# Patient Record
Sex: MALE | Race: WHITE | NOT HISPANIC OR LATINO | Employment: FULL TIME | URBAN - METROPOLITAN AREA
[De-identification: names, ages, dates, MRNs, and addresses within clinical notes are randomized per-mention and may not be internally consistent; named-entity substitution may affect disease eponyms.]

---

## 2020-02-20 ENCOUNTER — OCCMED (OUTPATIENT)
Dept: URGENT CARE | Facility: CLINIC | Age: 40
End: 2020-02-20
Payer: OTHER MISCELLANEOUS

## 2020-02-20 ENCOUNTER — APPOINTMENT (OUTPATIENT)
Dept: RADIOLOGY | Facility: CLINIC | Age: 40
End: 2020-02-20
Payer: OTHER MISCELLANEOUS

## 2020-02-20 DIAGNOSIS — S99.922A FOOT INJURY, LEFT, INITIAL ENCOUNTER: Primary | ICD-10-CM

## 2020-02-20 DIAGNOSIS — S99.922A FOOT INJURY, LEFT, INITIAL ENCOUNTER: ICD-10-CM

## 2020-02-20 PROCEDURE — 99283 EMERGENCY DEPT VISIT LOW MDM: CPT | Performed by: NURSE PRACTITIONER

## 2020-02-20 PROCEDURE — 73590 X-RAY EXAM OF LOWER LEG: CPT

## 2020-02-20 PROCEDURE — 73610 X-RAY EXAM OF ANKLE: CPT

## 2020-02-20 PROCEDURE — G0382 LEV 3 HOSP TYPE B ED VISIT: HCPCS | Performed by: NURSE PRACTITIONER

## 2020-02-20 PROCEDURE — 73630 X-RAY EXAM OF FOOT: CPT

## 2020-02-27 ENCOUNTER — APPOINTMENT (OUTPATIENT)
Dept: URGENT CARE | Facility: CLINIC | Age: 40
End: 2020-02-27
Payer: OTHER MISCELLANEOUS

## 2020-02-27 PROCEDURE — 99213 OFFICE O/P EST LOW 20 MIN: CPT | Performed by: NURSE PRACTITIONER

## 2020-03-05 ENCOUNTER — APPOINTMENT (OUTPATIENT)
Dept: URGENT CARE | Facility: CLINIC | Age: 40
End: 2020-03-05
Payer: OTHER MISCELLANEOUS

## 2020-03-05 PROCEDURE — 99213 OFFICE O/P EST LOW 20 MIN: CPT | Performed by: NURSE PRACTITIONER

## 2021-10-11 ENCOUNTER — TRANSITIONAL CARE MANAGEMENT (OUTPATIENT)
Dept: FAMILY MEDICINE CLINIC | Facility: CLINIC | Age: 41
End: 2021-10-11

## 2021-10-18 ENCOUNTER — APPOINTMENT (OUTPATIENT)
Dept: LAB | Facility: HOSPITAL | Age: 41
End: 2021-10-18

## 2021-10-18 ENCOUNTER — OFFICE VISIT (OUTPATIENT)
Dept: FAMILY MEDICINE CLINIC | Facility: CLINIC | Age: 41
End: 2021-10-18

## 2021-10-18 VITALS
HEIGHT: 72 IN | OXYGEN SATURATION: 92 % | HEART RATE: 75 BPM | SYSTOLIC BLOOD PRESSURE: 138 MMHG | DIASTOLIC BLOOD PRESSURE: 78 MMHG | TEMPERATURE: 97.1 F | WEIGHT: 179.6 LBS | BODY MASS INDEX: 24.33 KG/M2 | RESPIRATION RATE: 17 BRPM

## 2021-10-18 DIAGNOSIS — D72.829 LEUKOCYTOSIS, UNSPECIFIED TYPE: Primary | ICD-10-CM

## 2021-10-18 DIAGNOSIS — K85.20 ALCOHOL-INDUCED ACUTE PANCREATITIS, UNSPECIFIED COMPLICATION STATUS: ICD-10-CM

## 2021-10-18 DIAGNOSIS — K70.10 ACUTE ALCOHOLIC LIVER DISEASE: ICD-10-CM

## 2021-10-18 DIAGNOSIS — I10 ESSENTIAL HYPERTENSION: ICD-10-CM

## 2021-10-18 PROBLEM — R65.10 SIRS DUE TO NON-INFECTIOUS PROCESS WITHOUT ACUTE ORGAN DYSFUNCTION (HCC): Status: ACTIVE | Noted: 2021-09-23

## 2021-10-18 PROBLEM — F10.10 ALCOHOL ABUSE: Status: ACTIVE | Noted: 2019-09-11

## 2021-10-18 PROBLEM — Z72.0 TOBACCO ABUSE: Status: ACTIVE | Noted: 2021-09-22

## 2021-10-18 PROBLEM — N17.9 ACUTE KIDNEY INJURY (HCC): Status: ACTIVE | Noted: 2021-09-24

## 2021-10-18 PROCEDURE — 99495 TRANSJ CARE MGMT MOD F2F 14D: CPT | Performed by: FAMILY MEDICINE

## 2021-10-18 RX ORDER — AMLODIPINE BESYLATE 10 MG/1
TABLET ORAL
COMMUNITY
Start: 2021-10-07

## 2021-10-18 RX ORDER — CLONIDINE HYDROCHLORIDE 0.2 MG/1
TABLET ORAL
COMMUNITY
Start: 2021-10-07

## 2021-10-18 RX ORDER — LABETALOL 200 MG/1
TABLET, FILM COATED ORAL
COMMUNITY
Start: 2021-10-07

## 2021-10-18 RX ORDER — LANSOPRAZOLE 30 MG/1
30 CAPSULE, DELAYED RELEASE ORAL DAILY
COMMUNITY

## 2021-10-18 RX ORDER — PANTOPRAZOLE SODIUM 40 MG/1
TABLET, DELAYED RELEASE ORAL
COMMUNITY
Start: 2021-10-07

## 2021-10-18 RX ORDER — PROBENECID AND COLCHICINE 500; .5 MG/1; MG/1
1 TABLET ORAL DAILY
COMMUNITY
Start: 2021-10-07 | End: 2021-10-27

## 2021-10-18 RX ORDER — DIPHENOXYLATE HYDROCHLORIDE AND ATROPINE SULFATE 2.5; .025 MG/1; MG/1
TABLET ORAL
COMMUNITY
Start: 2021-10-07

## 2021-10-19 ENCOUNTER — TELEPHONE (OUTPATIENT)
Dept: FAMILY MEDICINE CLINIC | Facility: CLINIC | Age: 41
End: 2021-10-19

## 2023-02-22 ENCOUNTER — TELEPHONE (OUTPATIENT)
Dept: FAMILY MEDICINE CLINIC | Facility: CLINIC | Age: 43
End: 2023-02-22

## 2023-02-22 NOTE — TELEPHONE ENCOUNTER
----- Message from Amina Brandt MA sent at 2/22/2023  1:08 PM EST -----  Regarding: Need appointment  Please contact patient to schedule follow up appointment with their provider  Last seen 2021  Patient recently at [address] [place] [address] [address] for alcohol-induced pancreatitis   (Not a TCM) 24-Apr-2022 00:19

## 2023-03-30 ENCOUNTER — TELEPHONE (OUTPATIENT)
Dept: FAMILY MEDICINE CLINIC | Facility: CLINIC | Age: 43
End: 2023-03-30

## 2023-03-30 NOTE — TELEPHONE ENCOUNTER
----- Message from Kofi Caceres MA sent at 2/22/2023  1:08 PM EST -----  Regarding: Need appointment  Please contact patient to schedule follow up appointment with their provider  Last seen 2021  Patient recently at [facility] for alcohol-induced pancreatitis   (Not a TCM)

## 2023-11-21 ENCOUNTER — TELEPHONE (OUTPATIENT)
Dept: FAMILY MEDICINE CLINIC | Facility: CLINIC | Age: 43
End: 2023-11-21